# Patient Record
Sex: MALE | URBAN - METROPOLITAN AREA
[De-identification: names, ages, dates, MRNs, and addresses within clinical notes are randomized per-mention and may not be internally consistent; named-entity substitution may affect disease eponyms.]

---

## 2024-06-01 ENCOUNTER — HOSPITAL ENCOUNTER (EMERGENCY)
Facility: HOSPITAL | Age: 29
Discharge: HOME | End: 2024-06-01
Attending: EMERGENCY MEDICINE

## 2024-06-01 VITALS
SYSTOLIC BLOOD PRESSURE: 137 MMHG | TEMPERATURE: 98 F | DIASTOLIC BLOOD PRESSURE: 93 MMHG | WEIGHT: 220 LBS | BODY MASS INDEX: 29.8 KG/M2 | RESPIRATION RATE: 19 BRPM | OXYGEN SATURATION: 98 % | HEIGHT: 72 IN | HEART RATE: 86 BPM

## 2024-06-01 DIAGNOSIS — R52 PAIN: ICD-10-CM

## 2024-06-01 DIAGNOSIS — T14.90XA TRAUMA: ICD-10-CM

## 2024-06-01 PROCEDURE — 99282 EMERGENCY DEPT VISIT SF MDM: CPT | Mod: ER

## 2024-06-01 NOTE — ED PROVIDER NOTES
SCRIBE #1 NOTE: IJames am scribing for, and in the presence of,  Agustín Padron MD. I have scribed the following portions of the note - Other sections scribed: HPI, ROS, PE.   SCRIBE #2 NOTE: Sherwin ZAFAR am scribing for, and in the presence of,  Agustín Padron MD.         EM PHYSICIAN NOTE       This patient presents with a complaint of   Chief Complaint   Patient presents with    Foot Injury     Left foot pain x 1 week. Denies recent injury and trauma        Source of HPI & ROS: patient    HPI: Monroe Lieberman is a 27 y/o male with no pertinent PMHx, presents to the ED for evaluation of L foot pain x1 week. Patient reports of pain to the plantar aspect, causing difficulty walking 2/2 pain. States that walking exacerbates his pain, further causing redness and swelling. Denies any known trauma. Endorses a hx of gout but states that it is a different pain. Attempted treatment for pain with ibuprofen w/ temporary relief. Patient has not taken any other medications at this time. Deneis CP, SOB,abdominal pain, nausea, vomiting or any other associated symptoms. NKDA     Review of patient's allergies indicates:  No Known Allergies    Preferred pharmacy: [facility]        Pertinent REVIEW of SYSTEMS    GENERAL/CONSTITUTIONAL: There is not a report of fever   CARDIOVASCULAR: There is not a report of chest pain   RESPIRATORY: There is not a report of cough or SOB  GASTROINTESTINAL: There is not a report of  vomiting, diarrhea  MUSCULOSKELETAL: There is a report of myalgias.   HEMATOLOGIC/LYMPHATIC: There is not a report of anticoagulant/antithrombotic use.       The nurse's notes and triage vital signs were reviewed.    PHYSICAL EXAMINATION    ED Triage Vitals   Enc Vitals Group      BP 06/01/24 1013 (!) 137/93      Pulse 06/01/24 1013 86      Resp 06/01/24 1013 19      Temp 06/01/24 1013 98.1 °F (36.7 °C)      Temp Source 06/01/24 1013 Oral      SpO2 06/01/24 1013 98 %      Weight 06/01/24 1013  220 lb      Height 06/01/24 1048 6'      Head Circumference --       Peak Flow --       Pain Score --       Pain Loc --       Pain Education --       Exclude from Growth Chart --      Vital signs and Pulse Ox reviewed in clinical context. Abnormalities noted:  Mild blood pressure elevation  Body mass index is 29.84 kg/m².  Pt's level of consciousness is Awake and Alert, and the patient is in mild distress.  Skin: warm, pink and dry.  Capillary refill is less than 2 seconds.  Mucosa: normal  Head and Neck: no JVD, neck supple  Cardiac exam: RRR I did not appreciate a murmur.  Pulmonary exam: unlabored and clear  Abd Exam: soft nontender   Musculoskeletal: Tenderness to left foot. No pain with axial loading.  There is mild warmth and erythema and swelling  Neurologic: GCS 15; moving all extremities equally, no facial droop       Medical decision making:   Nurses notes and Vital Signs reviewed.     Problems: Today's visit reveals  Monroe Lieberman is a 28 y.o. male, with no pertinent PMHx, who presents to the ED with left foot injury for a week. which is a/an Acute problem that is concerning for deterioration due to a differential diagnosis that includes cellulitis, fracture, sprain.     Other problems today include elevated blood pressure x1     MDM Components integrated into this visit: Social determinants of health impacting care today: Access to PCP impaired because patient was unable to obtain appointment with PCP in a timely manner, Prescription drug management    Considerations: My decision to discharge home this patient is based on plan for antibiotics and close follow-up.          See ER course below for lab test ordered, results reviewed, independent interpretation of images or EKG, discussion with consultants, data obtained from sources other than patient:            No orders of the defined types were placed in this encounter.    Medications - No data to display        Diagnoses that have been ruled out:   None    Diagnoses that are still under consideration:   None   Final diagnoses:   Pain   Trauma          Disposition:  Discharge      Referral for follow-up  Follow-up Information    None         Prescription management:  ED Prescriptions    None            Agustín Padron MD         This note was created using Dict[employer].  This program may occasionally misinterpret certain words and phrases.      SCRIBE ATTESTATION NOTE:   I attest that I personally performed the services documented by the scribe and acknowledged and confirm the content of the note.   Nurses notes were reviewed.  Agustín Vieira MD  06/01/24 2971